# Patient Record
(demographics unavailable — no encounter records)

---

## 2024-11-05 NOTE — REVIEW OF SYSTEMS
[Fatigue] : fatigue [Visual Field Defect] : no visual field defect [Chest Pain] : no chest pain [Palpitations] : no palpitations [Shortness Of Breath] : no shortness of breath [Nausea] : no nausea [Constipation] : no constipation [Vomiting] : no vomiting [Diarrhea] : no diarrhea [Polyuria] : no polyuria [Polydipsia] : no polydipsia

## 2024-11-05 NOTE — ASSESSMENT
[Levothyroxine] : The patient was instructed to take Levothyroxine on an empty stomach, separate from vitamins, and wait at least 30 minutes before eating [FreeTextEntry1] : 90 year old female with hypothyroidism, MNG s/p benign FNA of RMP and LMP nodules in 2013, also with prediabetes, hypertension, hyperlipidemia, and vitamin D deficiency.  1. Hypothyroid-  -Increase LT4 to 125 mcg QD  2. MNG -Overdue thyroid u/s  -Rx given   3. Prediabetes- - improved  will watch diet and exercise    4. Hypertension- controlled. -Continue ARB.   5. Hyperlipidemia- acceptable. -Continue statin. -followed by PCP  6. Vitamin D deficiency -Double up on vitamin D supplement. -Repeat level in 6 months.  7. Osteopenia Repeat DEXA  due the end of the month 11/2024 lab rx given   RTO in 6 months MD

## 2024-11-05 NOTE — HISTORY OF PRESENT ILLNESS
[FreeTextEntry1] : Quality:Hypothyroidism   Current Medications:LT4 125 mcg QD 6 tabs a week and 1/2  tab on Sunday    Thyroid U/S:  MNG s/p FNA in 2013 of RMP and LMP nodules- benign.  Current tft's: TSH 4.1  Weight: stable  Hx: -MOHS surgery basal cell CA on right basolabial fold -pt does have bulging dis in lower back  -seeing cardiology for Afib - has been stable  -occ has tingling in arms when lays on one side- moves around and is better  -Diiverticulitis

## 2025-06-03 NOTE — HISTORY OF PRESENT ILLNESS
[FreeTextEntry1] : Quality:Hypothyroidism   Current Medications: LT4 125 mcg QD   Thyroid U/S:  MNG s/p FNA in 2013 of RMP and LMP nodules- benign.  Current tft's: TSH 0.43  Weight: stable  Hx: -MOHS surgery basal cell CA on right basolabial fold -pt does have bulging dis in lower back  -seeing cardiology for Afib - has been stable  -occ has tingling in arms when lays on one side- moves around and is better  -Diiverticulitis -Caral tunnel -Going to PT for pain below right hip